# Patient Record
Sex: MALE | Race: WHITE | NOT HISPANIC OR LATINO | Employment: UNEMPLOYED | URBAN - METROPOLITAN AREA
[De-identification: names, ages, dates, MRNs, and addresses within clinical notes are randomized per-mention and may not be internally consistent; named-entity substitution may affect disease eponyms.]

---

## 2021-10-08 ENCOUNTER — HOSPITAL ENCOUNTER (OUTPATIENT)
Dept: ONCOLOGY | Facility: HOSPITAL | Age: 31
End: 2021-10-09
Attending: ORTHOPAEDIC SURGERY | Admitting: ORTHOPAEDIC SURGERY

## 2021-10-08 LAB
GLUCOSE SERPL-MCNC: 88 MG/DL (ref 70–105)
HCO3 UR-SCNC: 27.5 MMOL/L (ref 22–26)
HCT VFR BLD CALC: 44 % (ref 38–51)
HGB BLD-MCNC: 15 MG/DL (ref 12–17)
PCO2 BLDA: 54.1 MMHG (ref 35–45)
PH SMN: 7.31 [PH] (ref 7.35–7.45)
PO2 BLDA: 36 MMHG (ref 80–100)
POC BE: 1 MMOL/L (ref -2–3)
POC IONIZED CALCIUM: 1.18 MMOL/L (ref 1.12–1.32)
POC SATURATED O2: 62 % (ref 96–97)
POC TCO2: 29 MMOL/L (ref 24–29)
POTASSIUM BLD-SCNC: 3.6 MMOL/L (ref 3.5–4.9)
SARS-COV-2 AG RESP QL IA.RAPID: NEGATIVE
SODIUM BLD-SCNC: 144 MMOL/L (ref 138–146)

## 2021-10-09 LAB
BUN SERPL-MCNC: 11.1 MG/DL (ref 8.9–20.6)
CALCIUM SERPL-MCNC: 8.6 MG/DL (ref 8.4–10.2)
CHLORIDE SERPL-SCNC: 103 MMOL/L (ref 98–107)
CO2 SERPL-SCNC: 23 MMOL/L (ref 22–29)
CREAT SERPL-MCNC: 0.8 MG/DL (ref 0.73–1.18)
CREAT/UREA NIT SERPL: 14
ERYTHROCYTE [DISTWIDTH] IN BLOOD BY AUTOMATED COUNT: 12.8 % (ref 11.5–17)
GLUCOSE SERPL-MCNC: 101 MG/DL (ref 74–100)
HCT VFR BLD AUTO: 42.6 % (ref 42–52)
HGB BLD-MCNC: 14.5 GM/DL (ref 14–18)
MCH RBC QN AUTO: 29 PG (ref 27–31)
MCHC RBC AUTO-ENTMCNC: 34 GM/DL (ref 33–36)
MCV RBC AUTO: 85.2 FL (ref 80–94)
PLATELET # BLD AUTO: 321 X10(3)/MCL (ref 130–400)
PMV BLD AUTO: 10.1 FL (ref 9.4–12.4)
POTASSIUM SERPL-SCNC: 4 MMOL/L (ref 3.5–5.1)
RBC # BLD AUTO: 5 X10(6)/MCL (ref 4.7–6.1)
SODIUM SERPL-SCNC: 137 MMOL/L (ref 136–145)
WBC # SPEC AUTO: 13.7 X10(3)/MCL (ref 4.5–11.5)

## 2022-04-11 ENCOUNTER — HISTORICAL (OUTPATIENT)
Dept: ADMINISTRATIVE | Facility: HOSPITAL | Age: 32
End: 2022-04-11

## 2022-04-29 VITALS
SYSTOLIC BLOOD PRESSURE: 132 MMHG | DIASTOLIC BLOOD PRESSURE: 72 MMHG | WEIGHT: 170 LBS | HEIGHT: 65 IN | BODY MASS INDEX: 28.32 KG/M2

## 2022-04-30 NOTE — OP NOTE
DATE OF SURGERY:    10/08/2021    SURGEON:  Gary Porter MD    PREOPERATIVE DIAGNOSIS:  Complex laceration to right upper extremity with foreign body.    POSTOPERATIVE DIAGNOSIS:  Complex laceration to right upper extremity with foreign body.    PROCEDURE:  Irrigation and debridement of skin, subcutaneous tissue, fascia and muscle.    HISTORY OF PRESENT ILLNESS:  The patient is a 30-year-old male who presented to the ER.  Patient was using a  when the  caught and hit and struck him in the right upper extremity in his mid forearm on the dorsal aspect.  Was seen in an outside facility, transferred here for possible arterial injury.  The patient presented here as a level 2 trauma alert, was seen by the ER staff, noted to have muscle belly injury with contamination of the wound.  Risks, benefits and alternatives to irrigation, debridement of the right upper extremity were discussed in detail with the patient.  All questions answered.  No guarantees were made.  Despite these, patient decided to proceed with surgical intervention.    PROCEDURE IN DETAIL:  The patient was seen in preoperatively in preop holding area.  She was marked and consented for surgery.  She was placed under general endotracheal anesthesia.  The right arm prepped in normal sterile fashion.  Timeout was performed, verifying correct patient, site, side, and procedure, and all in agreement.  The patient had significant soft tissue contamination of dirt around the skin edges in the proximal muscle belly.  A fresh knife as well as a pickup were used to remove and debrided any contaminated muscle belly and skin down to fresh edges.  We then irrigated with 3 L normal saline.  We used a rongeur to debride any further contamination.  After complete, we then irrigated with more normal saline.       Afterwards, we changed gloves.  We changed drapes.  We evaluated the soft tissue belly and wound.  Injury initially went through his  ECRL, near full thickness.  The remaining muscle bellies were intact.  We were able to repair this with #1 PDS.  We used 2-0 Monocryl to subcutaneous tissue and 3-0 nylon on the skin.  The patient was then placed in a wrist splint to allow healing of his muscle bellies.         We will keep him in the hospital for 24 hours for IV antibiotics and plan to discharge him tomorrow on oral antibiotics.        ______________________________  Gary Porter MD    [facility]/  DD:  10/08/2021  Time:  05:29PM  DT:  10/08/2021  Time:  06:16PM  Job #:  [id number]

## 2022-04-30 NOTE — ED PROVIDER NOTES
"   Patient:   Ignacio Campos             MRN: [id number]            FIN: [id number]               Age:   30 years     Sex:  Male     :  1990   Associated Diagnoses:   Laceration of forearm, right, complicated   Author:   Ovidio GOLDMAN, Catherine Tarango      Basic Information   Time seen: Date & time 10/8/2021 13:01:00.   History source: Patient, EMS.   Arrival mode: Ambulance.   History limitation: Sedated.      History of Present Illness   The patient presents with 29 y/o male presenting to the ED via EMS for a laceration to his R forearm. According to EMS pt was at work using a saw when it "kicked back" and cut his forearm. EMS reports that there was a tourinquet in place on arrival which was removed and bleeding was then controlled using pressure bandages. EMS reports tendons and nerves were exposed and pt did not have full ROM of the affected arm. Transferred here for orthopedic and vascular consultation. Prior to arrival, patient received Dilaudid 2mg, Ativan 2mg, Ancef 2g and tetanus booster. Patient sedated, will awake for questioning, complaining of forearm pain. No other injury..  Type of injury: laceration.  The location where the incident occurred was at work.  Location: Right forearm. Radiating pain: none. The character of symptoms is pain and Bleeding.  The degree of pain is 9 /10.  .  The degree of swelling is minimal.  The exacerbating factor is movement.  The relieving factor is immobilization.  Risk factors consist of none.  Prior episodes: none.  Therapy today: emergency medical services and see nurses notes.  Associated symptoms:.        Review of Systems   Constitutional symptoms:  No fever   Skin symptoms:  laceration   Eye symptoms:     ENMT symptoms:     Respiratory symptoms:  No shortness of breath, no cough   Cardiovascular symptoms:  No chest pain   Gastrointestinal symptoms:  No abdominal pain, no nausea, no vomiting.    Genitourinary symptoms   No back pain, , R forearm pain s/t " laceration. Neurologic symptoms:  No headache, no numbness.       Health Status   Allergies: No known allergies.   Medications:  (Selected)   .   Immunizations: Tetanus up to date.      Past Medical/ Family/ Social History   Medical history: Negative.   Surgical history:    No procedure history items have been selected or recorded..   Family history:    No family history items have been selected or recorded..   Social history: Tobacco use: Regularly, Drug use: Marijuana, methamphetamines.      Physical Examination               Vital Signs   Oxygen saturation.   General:  Alert, no acute distress   Neurological:  Alert and oriented to person, place, time, and situation, No focal neurological deficit observed   Skin:  Warm, dry, intact, approximate 6cm complex laceration involving muscle to the anterior right forearm with a small vessel that is bleeding   Head:  Normocephalic, atraumatic   Neck:  Supple, trachea midline   Eye:  , extraocular movements are intact, normal conjunctiva   Ears, nose, mouth and throat:  Oral mucosa moist   Cardiovascular:  Regular rate and rhythm, 2+ radial and ulnar pulse RUE, Arterial pulses: Bilateral, radial, dorsalis pedis, 2+.    Respiratory:  Lungs are clear to auscultation, respirations are non-labored, breath sounds are equal, Symmetrical chest wall expansion.    Back:  Normal range of motion   Musculoskeletal:  Normal ROM, Laceration to R forearm. Sensation intact and can wiggle fingers on affected side. Unable to make a fist secondary to pain, unable to flex at the wrist. No obvious metallic foreign body to the wound   Gastrointestinal:  Soft, Nontender, Non distended, Normal bowel sounds   Psychiatric:  Cooperative      Medical Decision Making   Differential Diagnosis:  Fracture, laceration.    Rationale:  30-year-old male here for complex laceration at least involving the musculature, NVI otherwise. There was a small pulsatile bleed to a superficial vessel which was controlled  with 1 suture in the trauma room. Wound dressed with betadine and Dr. Porter called to evaluate the wound. Xray negative for obvious FB or fracture. No arterial injury suspected.  GCS 15, hemodynamically stable, no other injuries found.  Pain control as needed, however I will avoid sedating medications for now..   Documents reviewed:  Emergency department nurses' notes.   Orders  Launch Order Profile (Selected)   Inpatient Orders  Ordered  Capacity Management Bed Request: 10/08/21 15:27:16 CDT, Ortho  HT Screening: 10/08/21 13:03:33 CDT  Patient Isolation: 10/08/21 15:29:36 CDT, Contact Precautions, Constant Indicator  Patient Isolation: 10/08/21 15:29:36 CDT, Droplet Precautions, Constant Indicator  Place in Outpatient Observation: 10/08/21 15:27:00 CDT, Ortho German GOLDMAN, Gary SHEPHERD, No  Ordered (Dispatched)  COVID-19  IDnow: Stat collect, Nasal, 10/08/21 15:29:00 CDT, Stop date 10/08/21 15:29:00 CDT, Nurse collect  Completed  Ofirmev: 1,000 mg, form: Infusion, IV Piggyback, Once, Infuse over: 15 minute(s), first dose 10/08/21 14:08:00 CDT, stop date 10/08/21 14:08:00 CDT, STAT  XR Forearm Right 2 Views: Stat, 10/08/21 13:15:00 CDT, Trauma, None, Stretcher, Rad Type, Not Scheduled, 10/08/21 13:15:00 CDT  acetaminophen: 1,000 mg, 100 mL, Infusion, N/A, Once, Stop date 10/08/21 13:06:45 CDT, Physician Stop, 10/08/21 13:06:45 CDT  fentaNYL: 100 mcg, 2 mL, Injection, N/A, Once, Stop date 10/08/21 15:22:08 CDT, Physician Stop, 10/08/21 15:22:08 CDT  glycopyrrolate: 0.2 mg, 1 mL, Injection, N/A, Once, Stop date 10/08/21 15:25:02 CDT, Physician Stop, 10/08/21 15:25:02 CDT  lidocaine 2% PF: 4 mL, Injection, N/A, Once, Stop date 10/08/21 15:25:02 CDT, Physician Stop, 10/08/21 15:25:02 CDT  midazolam: 2 mg, 2 mL, Injection, N/A, Once, Stop date 10/08/21 15:22:04 CDT, Physician Stop, 10/08/21 15:22:04 CDT  phenylephrine: 1,000 mcg, 10 mL, Soln, N/A, Once, Stop date 10/08/21 15:25:07 CDT, Physician Stop, 10/08/21 15:25:07  CDT  propofol: 200 mg, 20 mL, Injection, N/A, Once, Stop date 10/08/21 15:21:56 CDT, Physician Stop, 10/08/21 15:21:56 CDT  succinylcholine: 200 mg, 10 mL, Soln, N/A, Once, Stop date 10/08/21 15:28:14 CDT, Physician Stop, 10/08/21 15:28:14 CDT  Completed (Exam Completed)  XR Historical: Routine, 10/08/21 13:07:00 CDT, for doctor's request, None, Ambulatory, Rad Type, Not Scheduled, 10/08/21 13:07:00 CDT.   Results review:  Lab results : Lab View   10/8/2021 16:32 CDT      POC Sodium                144 mmol/L                             POC Potassium             3.6 mmol/L                             POC Ion Calcium           1.18 mmol/L                             POC Glucose               88 mg/dL                             POC Hb                    15.0 mg/dL                             POC Hct                   44.0 %                             POC pH                    7.314  LOW                             POC pCO2                  54.1 mmHg  CRIT                             POC pO2                   36.0 mmHg  CRIT                             POC HCO3                  27.5 mmol/L  HI                             POC TCO2                  29.0 mmol/L                             POC sO2                   62.0 %  LOW                             POC BE                    1 mmol/L    10/8/2021 15:30 CDT      COVID-19 Rapid            NEGATIVE  .   Forearm x-ray findings  No fracture, interpretation by Emergency Physician, No foreign body.    Radiology results:  Reviewed radiologist's report, Rad Results (ST)  < 12 hrs   Accession: [id number]  Order: XR Forearm Right 2 Views  Report Dt/Tm: 10/08/2021 13:22  Report:   EXAM: XR Forearm Right 2 Views  DATE: 10/8/2021 1:16 PM CDT  INDICATION: Trauma     COMPARISON: None available.     TECHNIQUE: AP and lateral views of the right forearm.        FINDINGS:   No acute fracture or malalignment is identified of the right forearm.  The right elbow and right wrist  articulations are congruent on the  provided views. There is no aggressive-appearing bone lesion or  abnormal periosteal reaction. No soft tissue gas or calcification.  Bone mineralization is maintained.        IMPRESSION:  No acute osseous abnormality of the right forearm.      .       Reexamination/ Reevaluation   Time: 10/8/2021 15:20:00 .   Vital signs   results included from flowsheet : Vital Signs   10/8/2021 15:32 CDT      Peripheral Pulse Rate     66 bpm                             Heart Rate Monitored      71 bpm                             Respiratory Rate          14 br/min                             SpO2                      99 %                             Oxygen Therapy            Room air                             Systolic Blood Pressure   132 mmHg                             Diastolic Blood Pressure  85 mmHg                             Mean Arterial Pressure, Cuff              101 mmHg    10/8/2021 14:30 CDT      Peripheral Pulse Rate     71 bpm                             Heart Rate Monitored      66 bpm                             Respiratory Rate          17 br/min                             SpO2                      99 %                             Oxygen Therapy            Room air                             Systolic Blood Pressure   142 mmHg  HI                             Diastolic Blood Pressure  95 mmHg  HI                             Mean Arterial Pressure, Cuff              111 mmHg     Notes: Patient resting comfortably. Dr. Porter has evaluated the patient and will take him to the OR for washout and repair..      Procedure   Lumbar Puncture      Impression and Plan   Diagnosis   Laceration of forearm, right, complicated ([id number] S51.811A)      Calls-Consults   -  10/8/2021 14:02:00 , German GOLDMAN, Gary SHEPHERD, recommends to OR for washout and repair.    Plan   Condition: Stable.    Disposition: Admit time  10/8/2021 15:00:00, Place in Observation Unit, Gary Porter MD.     Counseled: Patient, Family, Regarding diagnosis, Regarding diagnostic results, Regarding treatment plan, Patient indicated understanding of instructions.    Notes:   I, Иван Thomas, acted solely as a scribe for and in the presence of Dr. Nolan who performed the service., I, Dr. Catherine Nolan, personally evaluated and examined this patient and agree with the documentation as above. .

## 2022-05-05 NOTE — HISTORICAL [facility] CERNER
This is a historical note converted from [employer] [facility]. Formatting and pictures may have been removed.  Please reference Juan Jose for original formatting and attached multimedia. Chief Complaint  trauma transfer from [place]  Reason for Consultation  Right forearm wound  History of Present Illness  30-year-old?male presents to the ER as a transfer from outside facility. ?Patient was?cutting up a frame?with a  when the  ran up his arm .? Patient with significant planes of pain. ?He was transferred here for above injuries. ?Noted to have a wound to his proximal forearm.? Had difficulty with?wrist dorsiflexion although?difficult examination?given sedation. ?He is accompanied by his wife.? Patient is right-hand dominant.? Ate breakfast but unsure when he last ate. ?Injury occurred roughly 3 hours ago.? He has received Ancef tetanus.  Review of Systems  Denies fevers, chills, chest pain, shortness of breath. Comprehensive review of systems performed and otherwise negative except as noted in HPI.  Physical Exam  Vitals & Measurements  HR:?71(Peripheral)? HR:?66(Monitored)? RR:?17? BP:?142/95? SpO2:?99%?  General: No acute distress, alert and oriented, healthy appearing?  HEENT: Head is atraumatic, mucous membranes are moist?  Cardiovascular: Brisk capillary refill  Lungs: Breathing non-labored?  Skin: no rashes appreciated?  Neurologic: Sensation is grossly intact distally  ?  Right upper extremity:  Patient with a roughly?8 x 5 cm laceration just proximal forearm on the dorsum.? Sensation intact to his hand to median, radial, ulnar distributions. ?He has brisk cap refill 2+ radial pulses.? He has good?finger flexion.? Positive EPL.? Positive?finger extensors.? Positive wrist extension although these?movements are?limited due to pain.? No significant extravasation.? Small amount of contamination to the wound.  Assessment/Plan  1.?Laceration of right forearm with foreign body?S51.033G  ?Patient with complex laceration  to the right?forearm. ?He has injury to his skin subcutaneous tissue and muscle. ?Appears have gone to the muscle belly of his extensor wad. ?We discussed all treatment option with patient as well as his wife. ?The risk benefits, alternatives to irrigation remove the right arm?were discussed in detail with the patient. ?All questions answered to satisfaction. ?No guarantees made peer despite his risk of procedure invention. ?Plan for I&D?with repair of damage structures of the right forearm. ?All questions answered their satisfaction.   Problem List/Past Medical History  Ongoing  No qualifying data  Historical  No qualifying data  Medications  Inpatient  No active inpatient medications  Home  No active home medications  Allergies  No active allergies  Lab Results  Labs Last 24 Hours?  No qualifying data available.  Diagnostic Results  AP lateral right form reviewed. ?Patient with?no displaced fracture noted.
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
## 2022-06-25 ENCOUNTER — HOSPITAL ENCOUNTER (INPATIENT)
Facility: HOSPITAL | Age: 32
LOS: 1 days | Discharge: HOME | DRG: 552 | End: 2022-06-25
Attending: STUDENT IN AN ORGANIZED HEALTH CARE EDUCATION/TRAINING PROGRAM | Admitting: SURGERY
Payer: MEDICAID

## 2022-06-25 VITALS
BODY MASS INDEX: 30.53 KG/M2 | WEIGHT: 190 LBS | TEMPERATURE: 99 F | OXYGEN SATURATION: 99 % | HEART RATE: 69 BPM | DIASTOLIC BLOOD PRESSURE: 85 MMHG | RESPIRATION RATE: 18 BRPM | SYSTOLIC BLOOD PRESSURE: 121 MMHG | HEIGHT: 66 IN
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
## 2022-06-25 DIAGNOSIS — S22.060A COMPRESSION FRACTURE OF T8 VERTEBRA, INITIAL ENCOUNTER: ICD-10-CM

## 2022-06-25 DIAGNOSIS — S12.600A CLOSED DISPLACED FRACTURE OF SEVENTH CERVICAL VERTEBRA, UNSPECIFIED FRACTURE MORPHOLOGY, INITIAL ENCOUNTER: ICD-10-CM

## 2022-06-25 LAB
ALBUMIN SERPL-MCNC: 3.9 GM/DL (ref 3.5–5)
ALBUMIN/GLOB SERPL: 1.6 RATIO (ref 1.1–2)
ALP SERPL-CCNC: 75 UNIT/L (ref 40–150)
ALT SERPL-CCNC: 26 UNIT/L (ref 0–55)
AST SERPL-CCNC: 24 UNIT/L (ref 5–34)
BASOPHILS # BLD AUTO: 0.05 X10(3)/MCL (ref 0–0.2)
BASOPHILS NFR BLD AUTO: 0.3 %
BILIRUBIN DIRECT+TOT PNL SERPL-MCNC: 0.4 MG/DL
BUN SERPL-MCNC: 10.7 MG/DL (ref 8.9–20.6)
CALCIUM SERPL-MCNC: 8.9 MG/DL (ref 8.4–10.2)
CHLORIDE SERPL-SCNC: 108 MMOL/L (ref 98–107)
CO2 SERPL-SCNC: 23 MMOL/L (ref 22–29)
CREAT SERPL-MCNC: 0.95 MG/DL (ref 0.73–1.18)
EOSINOPHIL # BLD AUTO: 0.01 X10(3)/MCL (ref 0–0.9)
EOSINOPHIL NFR BLD AUTO: 0.1 %
ERYTHROCYTE [DISTWIDTH] IN BLOOD BY AUTOMATED COUNT: 13.2 % (ref 11.5–17)
GLOBULIN SER-MCNC: 2.5 GM/DL (ref 2.4–3.5)
GLUCOSE SERPL-MCNC: 115 MG/DL (ref 74–100)
HCT VFR BLD AUTO: 40.3 % (ref 42–52)
HGB BLD-MCNC: 13.4 GM/DL (ref 14–18)
IMM GRANULOCYTES # BLD AUTO: 0.05 X10(3)/MCL (ref 0–0.02)
IMM GRANULOCYTES NFR BLD AUTO: 0.3 % (ref 0–0.43)
LACTATE SERPL-SCNC: 1.4 MMOL/L (ref 0.5–2.2)
LYMPHOCYTES # BLD AUTO: 1.87 X10(3)/MCL (ref 0.6–4.6)
LYMPHOCYTES NFR BLD AUTO: 12.6 %
MCH RBC QN AUTO: 28.6 PG (ref 27–31)
MCHC RBC AUTO-ENTMCNC: 33.3 MG/DL (ref 33–36)
MCV RBC AUTO: 86.1 FL (ref 80–94)
MONOCYTES # BLD AUTO: 1.72 X10(3)/MCL (ref 0.1–1.3)
MONOCYTES NFR BLD AUTO: 11.6 %
NEUTROPHILS # BLD AUTO: 11.1 X10(3)/MCL (ref 2.1–9.2)
NEUTROPHILS NFR BLD AUTO: 75.1 %
NRBC BLD AUTO-RTO: 0 %
PLATELET # BLD AUTO: 253 X10(3)/MCL (ref 130–400)
PMV BLD AUTO: 9.2 FL (ref 9.4–12.4)
POTASSIUM SERPL-SCNC: 3.6 MMOL/L (ref 3.5–5.1)
PROT SERPL-MCNC: 6.4 GM/DL (ref 6.4–8.3)
RBC # BLD AUTO: 4.68 X10(6)/MCL (ref 4.7–6.1)
SODIUM SERPL-SCNC: 140 MMOL/L (ref 136–145)
WBC # SPEC AUTO: 14.8 X10(3)/MCL (ref 4.5–11.5)

## 2022-06-25 PROCEDURE — S4991 NICOTINE PATCH NONLEGEND: HCPCS | Performed by: STUDENT IN AN ORGANIZED HEALTH CARE EDUCATION/TRAINING PROGRAM

## 2022-06-25 PROCEDURE — 85025 COMPLETE CBC W/AUTO DIFF WBC: CPT | Performed by: STUDENT IN AN ORGANIZED HEALTH CARE EDUCATION/TRAINING PROGRAM

## 2022-06-25 PROCEDURE — 36415 COLL VENOUS BLD VENIPUNCTURE: CPT | Performed by: STUDENT IN AN ORGANIZED HEALTH CARE EDUCATION/TRAINING PROGRAM

## 2022-06-25 PROCEDURE — 96376 TX/PRO/DX INJ SAME DRUG ADON: CPT

## 2022-06-25 PROCEDURE — 63600175 PHARM REV CODE 636 W HCPCS: Performed by: STUDENT IN AN ORGANIZED HEALTH CARE EDUCATION/TRAINING PROGRAM

## 2022-06-25 PROCEDURE — 83605 ASSAY OF LACTIC ACID: CPT | Performed by: STUDENT IN AN ORGANIZED HEALTH CARE EDUCATION/TRAINING PROGRAM

## 2022-06-25 PROCEDURE — 25000003 PHARM REV CODE 250: Performed by: STUDENT IN AN ORGANIZED HEALTH CARE EDUCATION/TRAINING PROGRAM

## 2022-06-25 PROCEDURE — 63600175 PHARM REV CODE 636 W HCPCS

## 2022-06-25 PROCEDURE — 96375 TX/PRO/DX INJ NEW DRUG ADDON: CPT

## 2022-06-25 PROCEDURE — 80053 COMPREHEN METABOLIC PANEL: CPT | Performed by: STUDENT IN AN ORGANIZED HEALTH CARE EDUCATION/TRAINING PROGRAM

## 2022-06-25 PROCEDURE — 11000001 HC ACUTE MED/SURG PRIVATE ROOM

## 2022-06-25 PROCEDURE — 96374 THER/PROPH/DIAG INJ IV PUSH: CPT

## 2022-06-25 PROCEDURE — 99285 EMERGENCY DEPT VISIT HI MDM: CPT | Mod: 25

## 2022-06-25 RX ORDER — MORPHINE SULFATE 4 MG/ML
INJECTION, SOLUTION INTRAMUSCULAR; INTRAVENOUS
Status: COMPLETED
Start: 2022-06-25 | End: 2022-06-25

## 2022-06-25 RX ORDER — LIDOCAINE HYDROCHLORIDE 10 MG/ML
1 INJECTION, SOLUTION EPIDURAL; INFILTRATION; INTRACAUDAL; PERINEURAL ONCE
Status: DISCONTINUED | OUTPATIENT
Start: 2022-06-25 | End: 2022-06-25 | Stop reason: HOSPADM

## 2022-06-25 RX ORDER — ACETAMINOPHEN 325 MG/1
650 TABLET ORAL EVERY 8 HOURS PRN
Status: DISCONTINUED | OUTPATIENT
Start: 2022-06-25 | End: 2022-06-25 | Stop reason: HOSPADM

## 2022-06-25 RX ORDER — ONDANSETRON 2 MG/ML
4 INJECTION INTRAMUSCULAR; INTRAVENOUS
Status: COMPLETED | OUTPATIENT
Start: 2022-06-25 | End: 2022-06-25

## 2022-06-25 RX ORDER — ACETAMINOPHEN 325 MG/1
650 TABLET ORAL EVERY 4 HOURS PRN
Status: DISCONTINUED | OUTPATIENT
Start: 2022-06-25 | End: 2022-06-25 | Stop reason: HOSPADM

## 2022-06-25 RX ORDER — SODIUM CHLORIDE, SODIUM LACTATE, POTASSIUM CHLORIDE, CALCIUM CHLORIDE 600; 310; 30; 20 MG/100ML; MG/100ML; MG/100ML; MG/100ML
INJECTION, SOLUTION INTRAVENOUS CONTINUOUS
Status: DISCONTINUED | OUTPATIENT
Start: 2022-06-25 | End: 2022-06-25 | Stop reason: HOSPADM

## 2022-06-25 RX ORDER — MORPHINE SULFATE 4 MG/ML
4 INJECTION, SOLUTION INTRAMUSCULAR; INTRAVENOUS
Status: COMPLETED | OUTPATIENT
Start: 2022-06-25 | End: 2022-06-25

## 2022-06-25 RX ORDER — ONDANSETRON 4 MG/1
8 TABLET, ORALLY DISINTEGRATING ORAL EVERY 8 HOURS PRN
Status: DISCONTINUED | OUTPATIENT
Start: 2022-06-25 | End: 2022-06-25 | Stop reason: HOSPADM

## 2022-06-25 RX ORDER — OXYCODONE AND ACETAMINOPHEN 5; 325 MG/1; MG/1
1 TABLET ORAL EVERY 6 HOURS PRN
Qty: 12 TABLET | Refills: 0 | Status: SHIPPED | OUTPATIENT
Start: 2022-06-25

## 2022-06-25 RX ORDER — OXYCODONE HYDROCHLORIDE 5 MG/1
5 TABLET ORAL EVERY 4 HOURS PRN
Status: DISCONTINUED | OUTPATIENT
Start: 2022-06-25 | End: 2022-06-25 | Stop reason: HOSPADM

## 2022-06-25 RX ORDER — MORPHINE SULFATE 4 MG/ML
2 INJECTION, SOLUTION INTRAMUSCULAR; INTRAVENOUS EVERY 4 HOURS PRN
Status: DISCONTINUED | OUTPATIENT
Start: 2022-06-25 | End: 2022-06-25 | Stop reason: HOSPADM

## 2022-06-25 RX ORDER — METHOCARBAMOL 750 MG/1
750 TABLET, FILM COATED ORAL 3 TIMES DAILY
Qty: 30 TABLET | Refills: 0 | Status: SHIPPED | OUTPATIENT
Start: 2022-06-25 | End: 2022-07-05

## 2022-06-25 RX ORDER — SODIUM CHLORIDE 0.9 % (FLUSH) 0.9 %
10 SYRINGE (ML) INJECTION
Status: DISCONTINUED | OUTPATIENT
Start: 2022-06-25 | End: 2022-06-25 | Stop reason: HOSPADM

## 2022-06-25 RX ORDER — TALC
6 POWDER (GRAM) TOPICAL NIGHTLY PRN
Status: DISCONTINUED | OUTPATIENT
Start: 2022-06-25 | End: 2022-06-25 | Stop reason: HOSPADM

## 2022-06-25 RX ORDER — IBUPROFEN 200 MG
1 TABLET ORAL
Status: DISCONTINUED | OUTPATIENT
Start: 2022-06-25 | End: 2022-06-25 | Stop reason: HOSPADM

## 2022-06-25 RX ORDER — ONDANSETRON 2 MG/ML
INJECTION INTRAMUSCULAR; INTRAVENOUS
Status: COMPLETED
Start: 2022-06-25 | End: 2022-06-25

## 2022-06-25 RX ADMIN — MORPHINE SULFATE 4 MG: 4 INJECTION, SOLUTION INTRAMUSCULAR; INTRAVENOUS at 03:06

## 2022-06-25 RX ADMIN — MORPHINE SULFATE 2 MG: 4 INJECTION INTRAVENOUS at 07:06

## 2022-06-25 RX ADMIN — ONDANSETRON: 2 INJECTION INTRAMUSCULAR; INTRAVENOUS at 02:06

## 2022-06-25 RX ADMIN — MORPHINE SULFATE: 4 INJECTION, SOLUTION INTRAMUSCULAR; INTRAVENOUS at 02:06

## 2022-06-25 RX ADMIN — OXYCODONE 5 MG: 5 TABLET ORAL at 01:06

## 2022-06-25 RX ADMIN — MORPHINE SULFATE 2 MG: 4 INJECTION INTRAVENOUS at 11:06

## 2022-06-25 RX ADMIN — SODIUM CHLORIDE, POTASSIUM CHLORIDE, SODIUM LACTATE AND CALCIUM CHLORIDE: 600; 310; 30; 20 INJECTION, SOLUTION INTRAVENOUS at 07:06

## 2022-06-25 RX ADMIN — NICOTINE 1 PATCH: 21 PATCH, EXTENDED RELEASE TRANSDERMAL at 07:06

## 2022-06-25 RX ADMIN — MORPHINE SULFATE 4 MG: 4 INJECTION INTRAVENOUS at 03:06

## 2022-06-25 RX ADMIN — MORPHINE SULFATE: 4 INJECTION INTRAVENOUS at 02:06
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
## 2022-06-25 NOTE — TERTIARY TRAUMA SURVEY NOTE
TERTIARY TRAUMA SURVEY (TTS)    List Injuries Identified to Date:   1.   C7 spinous process fracture, C5-C6 disc bulge, C4, C5 central disc protrusion with mild narrowing, edema of T8 vertebral body with acute mild compression fracture    List Operative and Interventional Radiologic Procedures:   1.   Non operative management, TLSO brace and hard cervical collar minimum 2 weeks until seen in clinic    Physical Assessment (pertinent findings):  HEENT:  4 cm laceration to the apex of the head status post laceration repair with absorbable suture per emergency department  Neck:  C-spine tenderness in hard cervical collar  Heart:  Regular rate and rhythm  Chest/Lungs:  Clear to auscultation bilaterally, satting well on room air  Abdomen:  Unremarkable  Back:  T-spine tenderness  Rectal:  Negative  Extremities:  Negative  Neurologic:  Mild paresthesias to bilateral upper extremities including rest pain    Tetanus given? Yes  EtOH elevated or +UDS on admission? yes  EtOH and risky behavior education given? Yes    Imaging Findings Review:    CT Scans:  All CT scans were from outside facility  Impression:     Impression:     1. There is fracture at the base of spinous process of C7 which extends across the bilateral laminae and is stable as compared to the prior CT scan. There is soft tissue edema in the interspinous and supraspinous soft tissues at the nape of neck extending from the level of C2 to the upper dorsal chest.     2. The spinal cord signal is within normal limits.     3. Details of C4-C5 and C5-C6 levels as above.     No significant discrepancy with overnight report.          X-rays:  Impression:       No acute findings bilateral wrists.          Howard Shearer
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
## 2022-06-29 ENCOUNTER — PATIENT OUTREACH (OUTPATIENT)
Dept: ADMINISTRATIVE | Facility: CLINIC | Age: 32
End: 2022-06-29
Payer: MEDICAID

## 2022-06-29 NOTE — PROGRESS NOTES
C3 nurse attempted to contact Ignacio Campos for a TCC post hospital discharge follow up call. No answer. No voicemail available.The patient does not have a scheduled HOSFU appointment.

## 2022-07-01 NOTE — PROGRESS NOTES
C3 nurse attempted to contact Ignacio Campos for a TCC post hospital discharge follow up call. No answer.  Left voicemail with emergency contact.The patient does not have a scheduled HOSFU appointment.